# Patient Record
Sex: FEMALE | Race: WHITE | NOT HISPANIC OR LATINO | Employment: OTHER | URBAN - METROPOLITAN AREA
[De-identification: names, ages, dates, MRNs, and addresses within clinical notes are randomized per-mention and may not be internally consistent; named-entity substitution may affect disease eponyms.]

---

## 2017-06-06 NOTE — PATIENT DISCUSSION
Nasolacrimal Duct Obstruction Counseling:  I have examined the patient and discussed or attempted dilation and irrigation of the nasolacrimal system. Obstruction to normal irrigation is found. The anatomy and causation of this problem was explained to the patient in detail. The risks and benefits of a dacryocystorhinostomy was discussed in detail, including the need for placement of a Dailey tubes for up to 6 months during the healing process. The patient understands and has had all questions answered and desires to proceed with the surgery as explained.

## 2017-06-06 NOTE — PATIENT DISCUSSION
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 A intranasal approach for a dacryocystorhinostomy was planned. The purpose of the nasal endoscopy was to evaluate a surgical site for abnormalities which could alter the surgical course including turbinate hypertrophy or septal deviation. A Stortz 0 degree endoscope was placed intranasally on both sides and the following observations were noted: no NSD, normal mucosa, enlarged turbinates.

## 2018-09-05 ENCOUNTER — IMPORTED ENCOUNTER (OUTPATIENT)
Dept: URBAN - METROPOLITAN AREA CLINIC 43 | Facility: CLINIC | Age: 63
End: 2018-09-05

## 2018-09-05 PROBLEM — H43.811: Noted: 2018-09-05

## 2018-09-05 PROBLEM — H25.12: Noted: 2018-09-05

## 2019-09-10 ENCOUNTER — IMPORTED ENCOUNTER (OUTPATIENT)
Dept: URBAN - METROPOLITAN AREA CLINIC 43 | Facility: CLINIC | Age: 64
End: 2019-09-10

## 2019-09-10 PROBLEM — H26.491: Noted: 2019-09-10

## 2019-09-10 PROBLEM — H25.12: Noted: 2019-09-10

## 2019-10-03 ENCOUNTER — PREPPED CHART (OUTPATIENT)
Dept: URBAN - METROPOLITAN AREA CLINIC 32 | Facility: CLINIC | Age: 64
End: 2019-10-03

## 2019-10-03 ENCOUNTER — IMPORTED ENCOUNTER (OUTPATIENT)
Dept: URBAN - METROPOLITAN AREA CLINIC 43 | Facility: CLINIC | Age: 64
End: 2019-10-03

## 2019-10-03 PROBLEM — H26.491: Noted: 2019-10-03

## 2019-10-03 PROBLEM — H25.12: Noted: 2019-10-03

## 2019-10-03 PROBLEM — H18.51: Noted: 2019-10-03

## 2019-10-03 PROBLEM — H16.223: Noted: 2019-10-03

## 2019-10-03 PROBLEM — H35.371: Noted: 2019-10-03

## 2019-10-03 PROBLEM — H43.813: Noted: 2019-10-03

## 2019-12-03 ENCOUNTER — SURGERY/PROCEDURE (OUTPATIENT)
Dept: URBAN - METROPOLITAN AREA CLINIC 32 | Facility: CLINIC | Age: 64
End: 2019-12-03

## 2019-12-03 DIAGNOSIS — H25.12: ICD-10-CM

## 2019-12-03 PROCEDURE — S9986T TECHNOLOGY PKG - PF

## 2019-12-03 PROCEDURE — 66984CV REMOVE CATARACT, INSERT LENS, CUSTOM VISION

## 2019-12-03 ASSESSMENT — VISUAL ACUITY
OS_CC: J1+
OD_CC: J1+
OS_CC: 20/20
OD_SC: 20/25
OS_SC: J1+
OS_SC: 4'/100
OD_CC: 20/20
OD_SC: J7-2
OS_BAT: 20/300

## 2019-12-03 ASSESSMENT — KERATOMETRY
OD_AXISANGLE2_DEGREES: 153
OD_K1POWER_DIOPTERS: 43.25
OS_K2POWER_DIOPTERS: 44
OS_AXISANGLE_DEGREES: 173
OD_K2POWER_DIOPTERS: 43.5
OS_K1POWER_DIOPTERS: 43.25
OS_AXISANGLE2_DEGREES: 83
OD_AXISANGLE_DEGREES: 63

## 2019-12-03 ASSESSMENT — TONOMETRY
OS_IOP_MMHG: 12
OD_IOP_MMHG: 13

## 2019-12-04 ENCOUNTER — CATARACT POST-OP 1-DAY (OUTPATIENT)
Dept: URBAN - METROPOLITAN AREA CLINIC 32 | Facility: CLINIC | Age: 64
End: 2019-12-04

## 2019-12-04 DIAGNOSIS — Z96.1: ICD-10-CM

## 2019-12-04 PROCEDURE — 99024 POSTOP FOLLOW-UP VISIT: CPT

## 2019-12-04 ASSESSMENT — KERATOMETRY
OS_K1POWER_DIOPTERS: 43.25
OS_K2POWER_DIOPTERS: 44
OD_AXISANGLE2_DEGREES: 153
OS_AXISANGLE_DEGREES: 173
OD_K1POWER_DIOPTERS: 43.25
OD_K2POWER_DIOPTERS: 43.5
OS_AXISANGLE2_DEGREES: 83
OD_AXISANGLE_DEGREES: 63

## 2019-12-04 ASSESSMENT — VISUAL ACUITY
OD_SC: 20/20
OS_SC: J1+
OS_SC: 20/200
OS_PH: 20/40

## 2019-12-04 ASSESSMENT — TONOMETRY: OS_IOP_MMHG: 13

## 2019-12-09 ENCOUNTER — POST-OP CATARACT (OUTPATIENT)
Dept: URBAN - METROPOLITAN AREA CLINIC 32 | Facility: CLINIC | Age: 64
End: 2019-12-09

## 2019-12-09 DIAGNOSIS — Z96.1: ICD-10-CM

## 2019-12-09 PROCEDURE — 99024 POSTOP FOLLOW-UP VISIT: CPT

## 2019-12-09 ASSESSMENT — KERATOMETRY
OD_AXISANGLE2_DEGREES: 153
OS_AXISANGLE_DEGREES: 173
OS_K2POWER_DIOPTERS: 44
OD_K2POWER_DIOPTERS: 43.5
OD_K1POWER_DIOPTERS: 43.25
OS_AXISANGLE2_DEGREES: 83
OS_K1POWER_DIOPTERS: 43.25
OD_AXISANGLE_DEGREES: 63

## 2019-12-09 ASSESSMENT — VISUAL ACUITY
OS_SC: 20/80
OS_SC: J1+
OS_PH: 20/40+1

## 2019-12-09 ASSESSMENT — TONOMETRY: OS_IOP_MMHG: 13

## 2020-01-13 ENCOUNTER — SURGERY/PROCEDURE (OUTPATIENT)
Dept: URBAN - METROPOLITAN AREA CLINIC 32 | Facility: CLINIC | Age: 65
End: 2020-01-13

## 2020-01-13 DIAGNOSIS — H26.491: ICD-10-CM

## 2020-01-13 PROCEDURE — 66821 AFTER CATARACT LASER SURGERY: CPT

## 2020-01-14 ASSESSMENT — KERATOMETRY
OD_K1POWER_DIOPTERS: 43.25
OS_K2POWER_DIOPTERS: 44
OS_AXISANGLE_DEGREES: 173
OS_K1POWER_DIOPTERS: 43.25
OS_AXISANGLE2_DEGREES: 83
OD_AXISANGLE2_DEGREES: 153
OD_AXISANGLE_DEGREES: 63
OD_K2POWER_DIOPTERS: 43.5

## 2020-01-27 ENCOUNTER — YAG POST-OP (OUTPATIENT)
Dept: URBAN - METROPOLITAN AREA CLINIC 32 | Facility: CLINIC | Age: 65
End: 2020-01-27

## 2020-01-27 DIAGNOSIS — Z98.890: ICD-10-CM

## 2020-01-27 PROCEDURE — 99024 POSTOP FOLLOW-UP VISIT: CPT

## 2020-01-27 ASSESSMENT — KERATOMETRY
OS_K2POWER_DIOPTERS: 44
OS_AXISANGLE2_DEGREES: 83
OS_AXISANGLE_DEGREES: 173
OD_K2POWER_DIOPTERS: 43.5
OD_K1POWER_DIOPTERS: 43.25
OS_K1POWER_DIOPTERS: 43.25
OD_AXISANGLE2_DEGREES: 153
OD_AXISANGLE_DEGREES: 63

## 2020-01-27 ASSESSMENT — VISUAL ACUITY
OU_SC: J1+
OS_SC: 20/200
OD_SC: 20/20
OS_PH: 20/40

## 2020-03-01 ASSESSMENT — KERATOMETRY
OD_AXISANGLE2_DEGREES: 153
OD_K2POWER_DIOPTERS: 42.5
OS_K2POWER_DIOPTERS: 43.25
OS_AXISANGLE_DEGREES: 165
OD_AXISANGLE_DEGREES: 63
OD_K1POWER_DIOPTERS: 43.5
OD_K2POWER_DIOPTERS: 43.25
OD_AXISANGLE2_DEGREES: 90
OS_AXISANGLE2_DEGREES: 75
OD_K1POWER_DIOPTERS: 43.5
OS_K1POWER_DIOPTERS: 44
OS_AXISANGLE_DEGREES: 173
OD_AXISANGLE_DEGREES: 180
OS_K2POWER_DIOPTERS: 43.25
OS_AXISANGLE2_DEGREES: 83
OS_K1POWER_DIOPTERS: 44

## 2020-03-01 ASSESSMENT — VISUAL ACUITY
OD_SC: 20/25
OS_SC: J1+
OD_CC: J1+
OS_OTHER: 20/300.
OS_CC: 20/20
OD_CC: J1+
OS_SC: 20/100
OS_PH: 20/100
OS_OTHER: 20/300.
OD_SC: 20/20
OD_SC: 20/20-1
OD_SC: J7-
OS_SC: J1+
OS_SC: 4'/100
OD_SC: 20/25
OD_SC: J7-2
OD_CC: 20/20
OS_CC: J1+
OS_SC: 20/20
OS_SC: J1+
OS_CC: J1+
OS_SC: 4'/100
OD_SC: J7-2

## 2020-03-01 ASSESSMENT — TONOMETRY
OS_IOP_MMHG: 12.0
OD_IOP_MMHG: 13.0
OD_IOP_MMHG: 13.0
OS_IOP_MMHG: 13.0
OS_IOP_MMHG: 13.0
OD_IOP_MMHG: 15.0

## 2020-09-14 ENCOUNTER — ESTABLISHED COMPREHENSIVE EXAM (OUTPATIENT)
Dept: URBAN - METROPOLITAN AREA CLINIC 32 | Facility: CLINIC | Age: 65
End: 2020-09-14

## 2020-09-14 DIAGNOSIS — H43.813: ICD-10-CM

## 2020-09-14 PROCEDURE — 92014 COMPRE OPH EXAM EST PT 1/>: CPT

## 2020-09-14 ASSESSMENT — KERATOMETRY
OD_K1POWER_DIOPTERS: 43.5
OD_K1POWER_DIOPTERS: 43.25
OS_AXISANGLE2_DEGREES: 89
OS_K1POWER_DIOPTERS: 43.25
OD_AXISANGLE2_DEGREES: 74
OD_K2POWER_DIOPTERS: 43.5
OS_K2POWER_DIOPTERS: 43.75
OD_K2POWER_DIOPTERS: 43
OS_AXISANGLE_DEGREES: 173
OS_AXISANGLE2_DEGREES: 83
OS_AXISANGLE_DEGREES: 179
OD_AXISANGLE_DEGREES: 63
OS_K2POWER_DIOPTERS: 44
OD_AXISANGLE2_DEGREES: 153
OD_AXISANGLE_DEGREES: 164

## 2020-09-14 ASSESSMENT — VISUAL ACUITY
OD_SC: 20/25
OS_SC: J1+

## 2020-09-14 ASSESSMENT — TONOMETRY
OS_IOP_MMHG: 13
OD_IOP_MMHG: 12

## 2021-09-20 ENCOUNTER — ESTABLISHED COMPREHENSIVE EXAM (OUTPATIENT)
Dept: URBAN - METROPOLITAN AREA CLINIC 32 | Facility: CLINIC | Age: 66
End: 2021-09-20

## 2021-09-20 DIAGNOSIS — H35.371: ICD-10-CM

## 2021-09-20 PROCEDURE — 92014 COMPRE OPH EXAM EST PT 1/>: CPT

## 2021-09-20 PROCEDURE — 92134 CPTRZ OPH DX IMG PST SGM RTA: CPT

## 2021-09-20 ASSESSMENT — VISUAL ACUITY
OS_SC: J1+
OD_SC: 20/20-2
OS_PH: 20/50
OS_SC: 20/200

## 2021-09-20 ASSESSMENT — TONOMETRY
OD_IOP_MMHG: 16
OS_IOP_MMHG: 13

## 2022-01-20 NOTE — PATIENT DISCUSSION
Patient is here for suture removal.  The patient has a normal amount of bruising and swelling consistent with the post operative course.  The suture lines are intact, there is no evidence of infection.  The plan is to remove the sutures today with an expectation of normal healing.  The post op course has been further reviewed with the patient. Sutures removed today without difficulty. Resume normal activity.  Resume any medications that were discontinued for surgery. Artificial tears prn burning/itching/blurry vision. Wash incisions/ lash line once daily with baby shampoo.

## 2022-09-21 ENCOUNTER — COMPREHENSIVE EXAM (OUTPATIENT)
Dept: URBAN - METROPOLITAN AREA CLINIC 32 | Facility: CLINIC | Age: 67
End: 2022-09-21

## 2022-09-21 DIAGNOSIS — H04.123: ICD-10-CM

## 2022-09-21 DIAGNOSIS — H18.592: ICD-10-CM

## 2022-09-21 DIAGNOSIS — H35.371: ICD-10-CM

## 2022-09-21 DIAGNOSIS — H43.813: ICD-10-CM

## 2022-09-21 PROCEDURE — 92134 CPTRZ OPH DX IMG PST SGM RTA: CPT

## 2022-09-21 PROCEDURE — 92014 COMPRE OPH EXAM EST PT 1/>: CPT

## 2022-09-21 ASSESSMENT — TONOMETRY
OS_IOP_MMHG: 16
OD_IOP_MMHG: 14

## 2022-09-21 ASSESSMENT — KERATOMETRY
OS_K1POWER_DIOPTERS: 44.00
OS_K2POWER_DIOPTERS: 43.50
OD_AXISANGLE_DEGREES: 160
OD_K2POWER_DIOPTERS: 43.25
OS_AXISANGLE_DEGREES: 90
OD_AXISANGLE2_DEGREES: 70
OS_AXISANGLE2_DEGREES: 180
OD_K1POWER_DIOPTERS: 43.75

## 2022-09-21 ASSESSMENT — VISUAL ACUITY
OD_SC: 20/20-2
OS_SC: J1+

## 2023-09-27 ENCOUNTER — COMPREHENSIVE EXAM (OUTPATIENT)
Dept: URBAN - METROPOLITAN AREA CLINIC 32 | Facility: CLINIC | Age: 68
End: 2023-09-27

## 2023-09-27 DIAGNOSIS — H35.371: ICD-10-CM

## 2023-09-27 DIAGNOSIS — H04.123: ICD-10-CM

## 2023-09-27 DIAGNOSIS — H43.813: ICD-10-CM

## 2023-09-27 DIAGNOSIS — H26.492: ICD-10-CM

## 2023-09-27 PROCEDURE — 99214 OFFICE O/P EST MOD 30 MIN: CPT

## 2023-09-27 PROCEDURE — 92134 CPTRZ OPH DX IMG PST SGM RTA: CPT

## 2023-09-27 ASSESSMENT — KERATOMETRY
OS_K2POWER_DIOPTERS: 43.50
OD_K1POWER_DIOPTERS: 43.75
OD_AXISANGLE_DEGREES: 172
OD_K2POWER_DIOPTERS: 43.00
OD_AXISANGLE2_DEGREES: 82
OS_AXISANGLE2_DEGREES: 7
OS_K1POWER_DIOPTERS: 44.00
OS_AXISANGLE_DEGREES: 97

## 2023-09-27 ASSESSMENT — TONOMETRY
OD_IOP_MMHG: 13
OS_IOP_MMHG: 16

## 2023-09-27 ASSESSMENT — VISUAL ACUITY
OD_SC: 20/20-2
OS_SC: J1+
OS_SC: 20/200
OD_SC: J16

## 2024-04-16 NOTE — PATIENT DISCUSSION
Detail Level: Generalized Diagnosis:Acquired stenosis of nasolacrimal duct, right side Detail Level: Zone

## 2024-09-24 ENCOUNTER — COMPREHENSIVE EXAM (OUTPATIENT)
Dept: URBAN - METROPOLITAN AREA CLINIC 32 | Facility: CLINIC | Age: 69
End: 2024-09-24

## 2024-09-24 DIAGNOSIS — H26.492: ICD-10-CM

## 2024-09-24 DIAGNOSIS — H04.123: ICD-10-CM

## 2024-09-24 DIAGNOSIS — H43.813: ICD-10-CM

## 2024-09-24 DIAGNOSIS — H35.371: ICD-10-CM

## 2024-09-24 PROCEDURE — 92014 COMPRE OPH EXAM EST PT 1/>: CPT
